# Patient Record
Sex: FEMALE | Race: WHITE
[De-identification: names, ages, dates, MRNs, and addresses within clinical notes are randomized per-mention and may not be internally consistent; named-entity substitution may affect disease eponyms.]

---

## 2021-07-12 ENCOUNTER — TRANSCRIPTION ENCOUNTER (OUTPATIENT)
Age: 41
End: 2021-07-12

## 2021-09-23 ENCOUNTER — TRANSCRIPTION ENCOUNTER (OUTPATIENT)
Age: 41
End: 2021-09-23

## 2022-08-10 ENCOUNTER — APPOINTMENT (OUTPATIENT)
Dept: ORTHOPEDIC SURGERY | Facility: CLINIC | Age: 42
End: 2022-08-10

## 2022-08-10 VITALS — WEIGHT: 140 LBS | BODY MASS INDEX: 21.97 KG/M2 | HEIGHT: 67 IN

## 2022-08-10 DIAGNOSIS — Z78.9 OTHER SPECIFIED HEALTH STATUS: ICD-10-CM

## 2022-08-10 DIAGNOSIS — M25.511 PAIN IN RIGHT SHOULDER: ICD-10-CM

## 2022-08-10 PROBLEM — Z00.00 ENCOUNTER FOR PREVENTIVE HEALTH EXAMINATION: Status: ACTIVE | Noted: 2022-08-10

## 2022-08-10 PROCEDURE — 73030 X-RAY EXAM OF SHOULDER: CPT | Mod: RT

## 2022-08-10 PROCEDURE — 99204 OFFICE O/P NEW MOD 45 MIN: CPT

## 2022-08-10 NOTE — HISTORY OF PRESENT ILLNESS
[de-identified] : The patient is a 41 year old R hand dominant female who presents today complaining of R shoulder pain.  \par Date of Injury/Onset: 01/2022; 07/2022\par Pain:    At Rest: 0/10 \par With Activity:  7/10 \par Mechanism of injury: FOOSH while ice skating on a frozen pond; dog strongly pulled the involved arm while walking and felt shearing sensation\par This is not a Work Related Injury being treated under Worker's Compensation.\par This is not an athletic injury occurring associated with an interscholastic or organized sports team.\par Quality of symptoms: aching, discomfort \par Improves with: massage, swimming\par Worse with: overhead activity\par Prior treatment: massage\par Prior Imaging: N/A\par Out of work/sport: _, since _\par School/Sport/Position/Occupation: Teacher, [employer]\par Additional Information: None\par

## 2022-08-10 NOTE — DISCUSSION/SUMMARY
[de-identified] : Patient originally injured right shoulder during an ice skating fall approximately 6 months ago. She recently re-injured her right shoulder being pulled while walking her dog, causing pain that is affecting her ability to perform her daily tasks. Patient has tried home exercise, anti-inflammatories, rest, with no improvement.\par Follow up after MR ARTHROGRAM of right shoulder to eval acute traumatic labral tear. 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 Kapoor\par I, Pari Anguiano, attest that this documentation has been prepared under the direction and in the presence of Provider David Kapoor MD.\par \par Activity Modification\par The patient was advised to modify their activities.\par \par Dx / Natural History\par The patient was advised of the diagnosis.  The natural history of the pathology was explained in full to the patient in layman's terms.  Several different treatment options were discussed and explained in full to the patient including the risks and benefits of both surgical and non-surgical treatments.  All questions and concerns were answered.\par \par Pain Guide Activities\par The patient was advised to let pain guide the gradual advancement of activities.\par \par RICE\par I explained to the patient that rest, ice, compression, and elevation would benefit them.  They may return to activity after follow-up or when they no longer have any pain.

## 2022-08-10 NOTE — PHYSICAL EXAM
[de-identified] : Neurologic: normal coordination, normal DTR UE/LE , normal sensation, normal mood and affect, orientated and able to communicate. \par Skin: normal skin, no rash, no ulcers and no lesions. \par Lymphatic: no obvious lymphadenopathy in areas examined. \par Constitutional: well developed and well nourished. \par Cardiovascular: peripheral vascular exam is grossly normal. \par Pulmonary: no respiratory distress, lungs clear to auscultation bilaterally. \par Abdomen: normal bowel sounds, non-tender, no HSM and no mass. \par \par RIGHT Xray of the SHOULDER - 3 views - is as follows: Unremarkable \par \par Right Shoulder: \par Forward Flexion: 150 degrees\par Abduction: 150 degrees \par Supine External Rotation: 60 degrees\par Severe apprehension\par Severe relocation\par +Skip sign\par +Neer\par \par Left Shoulder: \par Forward Flexion: 170 degrees\par Abduction: 170 degrees